# Patient Record
Sex: MALE | Race: WHITE | NOT HISPANIC OR LATINO | Employment: OTHER | URBAN - METROPOLITAN AREA
[De-identification: names, ages, dates, MRNs, and addresses within clinical notes are randomized per-mention and may not be internally consistent; named-entity substitution may affect disease eponyms.]

---

## 2017-01-17 ENCOUNTER — OFFICE VISIT (OUTPATIENT)
Dept: ENDOCRINOLOGY | Facility: MEDICAL CENTER | Age: 67
End: 2017-01-17
Payer: MEDICARE

## 2017-01-17 VITALS
HEART RATE: 50 BPM | BODY MASS INDEX: 38.17 KG/M2 | OXYGEN SATURATION: 95 % | SYSTOLIC BLOOD PRESSURE: 122 MMHG | WEIGHT: 266.6 LBS | HEIGHT: 70 IN | DIASTOLIC BLOOD PRESSURE: 60 MMHG

## 2017-01-17 DIAGNOSIS — I10 ESSENTIAL HYPERTENSION: ICD-10-CM

## 2017-01-17 DIAGNOSIS — E78.2 MIXED HYPERLIPIDEMIA: ICD-10-CM

## 2017-01-17 DIAGNOSIS — Z79.4 TYPE 2 DIABETES MELLITUS WITHOUT COMPLICATION, WITH LONG-TERM CURRENT USE OF INSULIN (HCC): ICD-10-CM

## 2017-01-17 DIAGNOSIS — E11.9 TYPE 2 DIABETES MELLITUS WITHOUT COMPLICATION, WITH LONG-TERM CURRENT USE OF INSULIN (HCC): ICD-10-CM

## 2017-01-17 LAB
HBA1C MFR BLD: 6.9 % (ref ?–5.8)
INT CON NEG: NORMAL
INT CON POS: NORMAL

## 2017-01-17 PROCEDURE — 99214 OFFICE O/P EST MOD 30 MIN: CPT | Mod: 25 | Performed by: INTERNAL MEDICINE

## 2017-01-17 PROCEDURE — 83036 HEMOGLOBIN GLYCOSYLATED A1C: CPT | Performed by: INTERNAL MEDICINE

## 2017-01-17 NOTE — PROGRESS NOTES
Endocrinology Clinic Progress Note  PCP: Carlitos Rosenthal D.O.    CC: Type 2 diabetes    HPI:  Wali Taylor is a 65 y.o. old patient who comes in today for routine follow up.    Type 2 diabetes: He is currently on Toujeo 40 units at bedtime and Humalog 20 units 3 times a day with meals plus correction factor of 2 additional units for every 50 mg/dL blood glucose above 150. He checks blood sugars 2-3 times a day. Fasting blood sugar in the morning has been very well controlled, no fasting hypoglycemia. Pre-meal blood sugar readings have been somewhat variable, ranging from 100s to mid 200s. He reports compliance with medications. One hypoglycemic episode in the past month. He denies any numbness or tingling in feet. Last eye exam was in December 2016, no history of diabetic retinopathy. He is also on metformin 500 mg twice a day.    Hypertension: Blood pressure is well controlled. He is on ARB, in addition to other and hypertensive agents.    Hyperlipidemia: He is currently on Lipitor, tolerating well.    ROS:  Constitutional: Positive for intentional weight loss  Cardiac: No palpitations or racing heart    Past Medical History:  Patient Active Problem List    Diagnosis Date Noted   • Cirrhosis of liver (CMS-HCC) 11/10/2016   • Type 2 diabetes mellitus, uncontrolled (CMS-HCC) 12/30/2015   • Morbid obesity (CMS-[facility]) 12/30/2015   • Essential hypertension 12/30/2015   • Dyslipidemia 12/30/2015       Medications:    Current outpatient prescriptions:   •  Blood Glucose Monitoring Suppl SUPPLIES Misc, One Touch ultra blood glucose test strips, for blood sugar check 4 times a day, 90 day supply, Disp: 400 Each, Rfl: 2  •  potassium chloride SA (K-DUR) 20 MEQ Tab CR, TAKE 1 TABLET DAILY AS NEEDED, Disp: 60 Tab, Rfl: 2  •  rifaximin (XIFAXAN) 550 MG Tab tablet, Take 1 Tab by mouth 2 Times a Day., Disp: 60 Tab, Rfl: 5  •  lactulose 10 GM/15ML Solution, Take 30 g by mouth 2 times a day., Disp: , Rfl:   •  metformin ER  (GLUCOPHAGE XR) 500 MG TABLET SR 24 HR, TAKE 2 TABLETS TWICE A DAY (Patient taking differently: 500 mg bid.), Disp: 360 Tab, Rfl: 3  •  atorvastatin (LIPITOR) 10 MG Tab, Take 1 Tab by mouth every bedtime., Disp: 90 Tab, Rfl: 3  •  Insulin Lispro, Human, (HUMALOG KWIKPEN) 200 UNIT/ML Solution Pen-injector, Inject 25-45 Units as instructed 3 times a day. (Patient taking differently: Inject 20-45 Units as instructed 3 times a day.), Disp: 12 PEN, Rfl: 2  •  Blood Glucose Monitoring Suppl Device, Meter: Dispense FreeStyle InsuLinx meter. Sig. Use as directed for blood sugar monitoring., Disp: 1 Device, Rfl: 0  •  glucose blood strip, 1 Each by Other route as needed. One touch Ultra blue test strips 6x a day, Disp: , Rfl:   •  isosorbide mononitrate SR (IMDUR) 60 MG TABLET SR 24 HR, Take 60 mg by mouth every morning. 2 1/2 tabs BID, Disp: , Rfl:   •  losartan (COZAAR) 50 MG Tab, Take 50 mg by mouth 2 times a day., Disp: , Rfl:   •  metoprolol SR (TOPROL XL) 100 MG TABLET SR 24 HR, Take 100 mg by mouth every day., Disp: , Rfl:   •  aspirin 81 MG tablet, Take 81 mg by mouth every day., Disp: , Rfl:   •  furosemide (LASIX) 20 MG Tab, Take 40 mg by mouth every day., Disp: , Rfl:   •  nitroglycerin (NITROSTAT) 0.4 MG SL Tab, Place 0.4 mg under tongue as needed for Chest Pain., Disp: , Rfl:   •  Multiple Vitamin (MULTIVITAMINS PO), Take  by mouth., Disp: , Rfl:   •  Cholecalciferol (VITAMIN D PO), Take 5,000 Units by mouth., Disp: , Rfl:   •  Vitamin E 200 UNITS Tab, Take  by mouth every day at 6 PM., Disp: , Rfl:   •  Cyanocobalamin (B-12) 1000 MCG Cap, Take  by mouth., Disp: , Rfl:   •  Omega 3 1200 MG Cap, Take  by mouth., Disp: , Rfl:   •  ondansetron (ZOFRAN) 4 MG Tab tablet, Take 1 Tab by mouth every 8 hours as needed for Nausea/Vomiting., Disp: 20 Tab, Rfl: 3  •  Insulin Pen Needle (B-D ULTRAFINE III SHORT PEN) 31G X 8 MM Misc, 1 Each by Does not apply route 4 times a day., Disp: 400 Each, Rfl: 3  •  Insulin Glargine  "(TOUJEO SOLOSTAR) 300 UNIT/ML Solution Pen-injector, Inject 67 Units as instructed every day. (Patient taking differently: Inject 20 Units as instructed every day.), Disp: 9 PEN, Rfl: 9    Labs:  Results for FLOR ADAMS (MRN [id number]) as of 1/17/2017 13:02   Ref. Range 8/10/2016 17:13 10/12/2016 14:28 11/10/2016 07:25 11/10/2016 07:31 11/10/2016 09:40   AST(SGOT) Latest Ref Range: 12-45 U/L 54 (H)       ALT(SGPT) Latest Ref Range: 2-50 U/L 30       Alkaline Phosphatase Latest Ref Range: 30-99 U/L 123 (H)       Total Bilirubin Latest Ref Range: 0.1-1.5 mg/dL 4.2 (H)       Direct Bilirubin Latest Ref Range: 0.1-0.5 mg/dL 1.0 (H)       Indirect Bilirubin Latest Ref Range: 0.0-1.0 mg/dL 3.2 (H)       Albumin Latest Ref Range: 3.2-4.9 g/dL 2.6 (L)       Total Protein Latest Ref Range: 6.0-8.2 g/dL 5.8 (L)       Ammonia Latest Ref Range: 11-45 umol/L 97 (H)       Glycohemoglobin Unknown  6.7      Glucose - Accu-Ck Latest Ref Range: 65-99 mg/dL   93  107 (H)         Physical Examination:  Vital signs: /60 mmHg  Pulse 50  Ht 1.778 m (5' 10\")  Wt 120.929 kg (266 lb 9.6 oz)  BMI 38.25 kg/m2  SpO2 95%  General: Well nourished, no apparent distress, cooperative  Eyes: No scleral icterus, no discharge  CVS: Regular rate and rhythm, S1 S2 normal, no murmur  Resp: Normal effort, clear to auscultation bilaterally  Psych: Alert and oriented, normal mood and affect    Assessment and Plan:    Type 2 diabetes mellitus with hyperglycemia, with long-term current use of insulin (HCC)  · Hemoglobin A1c today in the clinic is 6.9%  · Continue Toujeo 40 units at bedtime, and Humalog Humalog to 20 units 3 times a day with meals plus mid dose correction factor  · Continue metformin  · Repeat labs:  -     MICROALBUMIN CREAT RATIO URINE; Future  -     LIPID PROFILE; Future  -     COMP METABOLIC PANEL; Future  -     CBC WITHOUT DIFFERENTIAL; Future  -     TSH WITH REFLEX TO FT4; Future    Essential hypertension  · Blood pressure " is well controlled  · Continue losartan, in addition to other antihypertensive agents    Mixed hyperlipidemia  · Continue Lipitor  · We will repeat lipid profile    Return in about 3 months (around 4/17/2017).    Thank you for allowing me to participate in the care of this patient.    Calin Leon M.D.  10/12/2016    CC:   Carlitos Rosenthal D.O.    This note was created using voice recognition software (Dragon). The accuracy of the dictation is limited by the abilities of the software. I have reviewed the note prior to signing, however some errors in grammar and context are still possible. If you have any questions related to this note please do not hesitate to contact our office.

## 2017-01-17 NOTE — MR AVS SNAPSHOT
"        Wali Keita Claudia   2017 1:20 PM   Office Visit   MRN: [id number]    Department:  Endocrinology [facility]   Dept Phone:  [phone]    Description:  Male : 1950   Provider:  Calin Leon M.D.           Reason for Visit     Follow-Up Diabetes      Allergies as of 2017     No Known Allergies      You were diagnosed with     Type 2 diabetes mellitus without complication, with long-term current use of insulin (CMS-HCC)   [5489498]       Essential hypertension   [8826262]       Mixed hyperlipidemia   [272.2.ICD-9-CM]         Vital Signs     Blood Pressure Pulse Height Weight Body Mass Index Oxygen Saturation    122/60 mmHg 50 1.778 m (5' 10\") 120.929 kg (266 lb 9.6 oz) 38.25 kg/m2 95%    Smoking Status                   Never Smoker            Basic Information     Date Of Birth Sex Race Ethnicity Preferred Language    1950 Male White Non- English      Your appointments     2017  8:20 AM   Established Patient with Calin Leon M.D.   [facility]    [id number] [place] [address], [address]  [facility] [address] [phone]           You will be receiving a confirmation call a few days before your appointment from our automated call confirmation system.              Problem List              ICD-10-CM Priority Class Noted - Resolved    Type 2 diabetes mellitus, uncontrolled (CMS-HCC) E11.65   2015 - Present    Morbid obesity (CMS-HCC) E66.01   2015 - Present    Essential hypertension I10   2015 - Present    Dyslipidemia E78.5   2015 - Present    Cirrhosis of liver (CMS-HCC) K74.60   11/10/2016 - Present      Health Maintenance        Date Due Completion Dates    DIABETES MONOFILAMENT / LE EXAM 1951 ---    FASTING LIPID PROFILE 10/29/1967 ---    URINE ACR / MICROALBUMIN 10/29/1967 ---    SERUM CREATININE 10/29/1967 ---    IMM DTaP/Tdap/Td Vaccine (1 - Tdap) 10/29/1968 ---    COLONOSCOPY 10/29/1999 ---    IMM " ZOSTER VACCINE 10/29/2009 ---    IMM PNEUMOCOCCAL 65+ (ADULT) LOW/MEDIUM RISK SERIES (1 of 2 - PCV13) 10/29/2014 ---    IMM INFLUENZA (1) 9/1/2016 ---    RETINAL SCREENING 1/27/2017 1/27/2016, 1/27/2016 (Done), 1/25/2016    Override on 1/27/2016: Done    A1C SCREENING 4/12/2017 10/12/2016, 6/6/2016, 2/4/2016            Results     POCT Hemoglobin A1C      Component    Glycohemoglobin    6.9    Internal Control Negative    Internal Control Positive                        Current Immunizations     No immunizations on file.      Below and/or attached are the medications your provider expects you to take. Review all of your home medications and newly ordered medications with your provider and/or pharmacist. Follow medication instructions as directed by your provider and/or pharmacist. Please keep your medication list with you and share with your provider. Update the information when medications are discontinued, doses are changed, or new medications (including over-the-counter products) are added; and carry medication information at all times in the event of emergency situations     Allergies:  No Known Allergies          Medications  Valid as of: January 17, 2017 -  1:28 PM    Generic Name Brand Name Tablet Size Instructions for use    Aspirin (Tab) aspirin 81 MG Take 81 mg by mouth every day.        Atorvastatin Calcium (Tab) LIPITOR 10 MG Take 1 Tab by mouth every bedtime.        Blood Glucose Monitoring Suppl (Device) Blood Glucose Monitoring Suppl  Meter: Dispense FreeStyle InsuLinx meter. Sig. Use as directed for blood sugar monitoring.        Blood Glucose Monitoring Suppl (Misc) Blood Glucose Monitoring Suppl SUPPLIES One Touch ultra blood glucose test strips, for blood sugar check 4 times a day, 90 day supply        Cholecalciferol   Take 5,000 Units by mouth.        Cyanocobalamin (Cap) B-12 1000 MCG Take  by mouth.        Furosemide (Tab) LASIX 20 MG Take 40 mg by mouth every day.        Glucose Blood (Strip)  glucose blood  1 Each by Other route as needed. One touch Ultra blue test strips 6x a day        Insulin Glargine (Solution Pen-injector) Insulin Glargine 300 UNIT/ML Inject 67 Units as instructed every day.        Insulin Lispro (Solution Pen-injector) Insulin Lispro 200 UNIT/ML Inject 25-45 Units as instructed 3 times a day.        Insulin Pen Needle (Misc) Insulin Pen Needle 31G X 8 MM 1 Each by Does not apply route 4 times a day.        Isosorbide Mononitrate (TABLET SR 24 HR) IMDUR 60 MG Take 60 mg by mouth every morning. 2 1/2 tabs BID        Lactulose (Solution) lactulose 10 GM/15ML Take 30 g by mouth 2 times a day.        Losartan Potassium (Tab) COZAAR 50 MG Take 50 mg by mouth 2 times a day.        MetFORMIN HCl (TABLET SR 24 HR) GLUCOPHAGE  MG TAKE 2 TABLETS TWICE A DAY        Metoprolol Succinate (TABLET SR 24 HR) TOPROL  MG Take 100 mg by mouth every day.        Multiple Vitamin   Take  by mouth.        Nitroglycerin (SL Tab) NITROSTAT 0.4 MG Place 0.4 mg under tongue as needed for Chest Pain.        Omega-3 Fatty Acids (Cap) Omega 3 1200 MG Take  by mouth.        Ondansetron HCl (Tab) ZOFRAN 4 MG Take 1 Tab by mouth every 8 hours as needed for Nausea/Vomiting.        Potassium Chloride Jami CR (Tab CR) K-DUR 20 MEQ TAKE 1 TABLET DAILY AS NEEDED        RifAXIMin (Tab) XIFAXAN 550 MG Take 1 Tab by mouth 2 Times a Day.        Vitamin E (Tab) Vitamin E 200 UNITS Take  by mouth every day at 6 PM.        .                 Medicines prescribed today were sent to:     [facility] [address], [place] - [address]    [address] [facility] [address]    Phone: [phone] Fax: [phone]    Open 24 Hours?: No    [facility] - [place], [place] - [address]    [address] [address]    Phone: [phone] Fax: [phone]    Open 24 Hours?: No      Medication refill instructions:       If your prescription bottle indicates you have  medication refills left, it is not necessary to call your provider’s office. Please contact your pharmacy and they will refill your medication.    If your prescription bottle indicates you do not have any refills left, you may request refills at any time through one of the following ways: The online GrownOut system (except Urgent Care), by calling your provider’s office, or by asking your pharmacy to contact your provider’s office with a refill request. Medication refills are processed only during regular business hours and may not be available until the next business day. Your provider may request additional information or to have a follow-up visit with you prior to refilling your medication.   *Please Note: Medication refills are assigned a new Rx number when refilled electronically. Your pharmacy may indicate that no refills were authorized even though a new prescription for the same medication is available at the pharmacy. Please request the medicine by name with the pharmacy before contacting your provider for a refill.           GrownOut Access Code: [email or web address]  Expires: 2/15/2017 12:33 PM    GrownOut  A secure, online tool to manage your health information     [email or web address]’s GrownOut® is a secure, online tool that connects you to your personalized health information from the privacy of your home -- day or night - making it very easy for you to manage your healthcare. Once the activation process is completed, you can even access your medical information using the GrownOut alissa, which is available for free in the Apple Alissa store or Google Play store.     GrownOut provides the following levels of access (as shown below):   My Chart Features   [facility] Doctor [facility]  Specialists [facility]  Urgent  Care Non-[facility]  Primary Care  Doctor   Email your healthcare team securely and privately 24/7 X X X    Manage appointments: schedule your next appointment; view details of past/upcoming appointments X       Request prescription refills. X      View recent personal medical records, including lab and immunizations X X X X   View health record, including health history, allergies, medications X X X X   Read reports about your outpatient visits, procedures, consult and ER notes X X X X   See your discharge summary, which is a recap of your hospital and/or ER visit that includes your diagnosis, lab results, and care plan. X X       How to register for [employer]tech:  1. Go to  [email or web address].  2. Click on the Sign Up Now box, which takes you to the New Member Sign Up page. You will need to provide the following information:  a. Enter your Neurotech Access Code exactly as it appears at the top of this page. (You will not need to use this code after you’ve completed the sign-up process. If you do not sign up before the expiration date, you must request a new code.)   b. Enter your date of birth.   c. Enter your home email address.   d. Click Submit, and follow the next screen’s instructions.  3. Create a Neurotech ID. This will be your Neurotech login ID and cannot be changed, so think of one that is secure and easy to remember.  4. Create a Neurotech password. You can change your password at any time.  5. Enter your Password Reset Question and Answer. This can be used at a later time if you forget your password.   6. Enter your e-mail address. This allows you to receive e-mail notifications when new information is available in Neurotech.  7. Click Sign Up. You can now view your health information.    For assistance activating your Neurotech account, call [phone]

## 2017-04-18 DIAGNOSIS — Z79.4 TYPE 2 DIABETES MELLITUS WITHOUT COMPLICATION, WITH LONG-TERM CURRENT USE OF INSULIN (HCC): ICD-10-CM

## 2017-04-18 DIAGNOSIS — E11.9 TYPE 2 DIABETES MELLITUS WITHOUT COMPLICATION, WITH LONG-TERM CURRENT USE OF INSULIN (HCC): ICD-10-CM

## 2017-04-18 NOTE — TELEPHONE ENCOUNTER
Was the patient seen in the last year in this department? Yes 1/17/2017, next appt 6/9/2017    Does patient have an active prescription for medications requested? Yes     Received Request Via: Pharmacy

## 2017-04-19 DIAGNOSIS — Z79.4 TYPE 2 DIABETES MELLITUS WITHOUT COMPLICATION, WITH LONG-TERM CURRENT USE OF INSULIN (HCC): ICD-10-CM

## 2017-04-19 DIAGNOSIS — E11.9 TYPE 2 DIABETES MELLITUS WITHOUT COMPLICATION, WITH LONG-TERM CURRENT USE OF INSULIN (HCC): ICD-10-CM

## 2017-05-23 RX ORDER — METFORMIN HYDROCHLORIDE 500 MG/1
TABLET, EXTENDED RELEASE ORAL
Qty: 180 TAB | Refills: 3 | Status: SHIPPED | OUTPATIENT
Start: 2017-05-23 | End: 2018-04-25 | Stop reason: SDUPTHER

## 2017-06-09 ENCOUNTER — OFFICE VISIT (OUTPATIENT)
Dept: ENDOCRINOLOGY | Facility: MEDICAL CENTER | Age: 67
End: 2017-06-09
Payer: MEDICARE

## 2017-06-09 VITALS
SYSTOLIC BLOOD PRESSURE: 126 MMHG | HEIGHT: 70 IN | DIASTOLIC BLOOD PRESSURE: 78 MMHG | BODY MASS INDEX: 37.54 KG/M2 | OXYGEN SATURATION: 100 % | WEIGHT: 262.2 LBS | HEART RATE: 65 BPM

## 2017-06-09 DIAGNOSIS — Z79.4 TYPE 2 DIABETES MELLITUS WITHOUT COMPLICATION, WITH LONG-TERM CURRENT USE OF INSULIN (HCC): ICD-10-CM

## 2017-06-09 DIAGNOSIS — I10 ESSENTIAL HYPERTENSION: ICD-10-CM

## 2017-06-09 DIAGNOSIS — E78.2 MIXED HYPERLIPIDEMIA: ICD-10-CM

## 2017-06-09 DIAGNOSIS — E11.9 TYPE 2 DIABETES MELLITUS WITHOUT COMPLICATION, WITH LONG-TERM CURRENT USE OF INSULIN (HCC): ICD-10-CM

## 2017-06-09 LAB
HBA1C MFR BLD: 5.9 % (ref ?–5.8)
INT CON NEG: NORMAL
INT CON POS: NORMAL

## 2017-06-09 PROCEDURE — 99214 OFFICE O/P EST MOD 30 MIN: CPT | Mod: 25,GW | Performed by: INTERNAL MEDICINE

## 2017-06-09 PROCEDURE — 83036 HEMOGLOBIN GLYCOSYLATED A1C: CPT | Mod: QW,GW | Performed by: INTERNAL MEDICINE

## 2017-06-09 RX ORDER — SPIRONOLACTONE 25 MG/1
25 TABLET ORAL DAILY
COMMUNITY

## 2017-06-09 NOTE — MR AVS SNAPSHOT
"        Wali Keita Claudia   2017 8:20 AM   Office Visit   MRN: [id number]    Department:  Endocrinology [facility]   Dept Phone:  [phone]    Description:  Male : 1950   Provider:  Calin Leon M.D.           Reason for Visit     Follow-Up Diabetes      Allergies as of 2017     No Known Allergies      Vital Signs     Blood Pressure Pulse Height Weight Body Mass Index Oxygen Saturation    126/78 mmHg 65 1.778 m (5' 10\") 118.933 kg (262 lb 3.2 oz) 37.62 kg/m2 100%    Smoking Status                   Never Smoker            Basic Information     Date Of Birth Sex Race Ethnicity Preferred Language    1950 Male White Non- English      Your appointments     Sep 15, 2017  8:00 AM   Established Patient with Calin Leon M.D.   [facility]    [address], [address]  [facility] [address] [phone]           You will be receiving a confirmation call a few days before your appointment from our automated call confirmation system.              Problem List              ICD-10-CM Priority Class Noted - Resolved    Type 2 diabetes mellitus, uncontrolled (CMS-HCC) E11.65   2015 - Present    Morbid obesity (CMS-HCC) E66.01   2015 - Present    Essential hypertension I10   2015 - Present    Dyslipidemia E78.5   2015 - Present    Cirrhosis of liver (CMS-HCC) K74.60   11/10/2016 - Present      Health Maintenance        Date Due Completion Dates    DIABETES MONOFILAMENT / LE EXAM 1951 ---    FASTING LIPID PROFILE 10/29/1967 ---    URINE ACR / MICROALBUMIN 10/29/1967 ---    SERUM CREATININE 10/29/1967 ---    IMM DTaP/Tdap/Td Vaccine (1 - Tdap) 10/29/1968 ---    COLONOSCOPY 10/29/1999 ---    IMM ZOSTER VACCINE 10/29/2009 ---    IMM PNEUMOCOCCAL 65+ (ADULT) LOW/MEDIUM RISK SERIES (1 of 2 - PCV13) 10/29/2014 ---    RETINAL SCREENING 2017, 2016 (Done), 2016    Override on 2016: Done    A1C " SCREENING 7/17/2017 1/17/2017, 10/12/2016, 6/6/2016, 2/4/2016            Current Immunizations     No immunizations on file.      Below and/or attached are the medications your provider expects you to take. Review all of your home medications and newly ordered medications with your provider and/or pharmacist. Follow medication instructions as directed by your provider and/or pharmacist. Please keep your medication list with you and share with your provider. Update the information when medications are discontinued, doses are changed, or new medications (including over-the-counter products) are added; and carry medication information at all times in the event of emergency situations     Allergies:  No Known Allergies          Medications  Valid as of: June 09, 2017 -  8:47 AM    Generic Name Brand Name Tablet Size Instructions for use    Aspirin (Tab) aspirin 81 MG Take 81 mg by mouth every day.        Atorvastatin Calcium (Tab) LIPITOR 10 MG Take 1 Tab by mouth every bedtime.        Blood Glucose Monitoring Suppl (Device) Blood Glucose Monitoring Suppl  Meter: Dispense FreeStyle InsuLinx meter. Sig. Use as directed for blood sugar monitoring.        Blood Glucose Monitoring Suppl (Misc) Blood Glucose Monitoring Suppl SUPPLIES One Touch ultra blood glucose test strips, for blood sugar check 4 times a day, 90 day supply        Cholecalciferol   Take 5,000 Units by mouth.        Cyanocobalamin (Cap) B-12 1000 MCG Take  by mouth.        Furosemide (Tab) LASIX 20 MG Take 40 mg by mouth every day.        Glucose Blood (Strip) glucose blood  1 Strip by Other route as needed. One touch Ultra blue test strips 6x a day        Insulin Glargine (Solution Pen-injector) Insulin Glargine 300 UNIT/ML Inject 40 Units as instructed every bedtime.        Insulin Lispro (Solution Pen-injector) Insulin Lispro 200 UNIT/ML Inject 20-35 Units as instructed 3 times a day.        Insulin Pen Needle (Misc) B-D ULTRAFINE III SHORT PEN 31G X 8  MM USE FOUR TIMES A DAY        Insulin Pen Needle (Misc) Insulin Pen Needle 31G X 8 MM 1 Each by Does not apply route 4 times a day.        Isosorbide Mononitrate (TABLET SR 24 HR) IMDUR 60 MG Take 60 mg by mouth every morning. 2 1/2 tabs BID        Lactulose (Solution) lactulose 10 GM/15ML Take 30 g by mouth 2 times a day.        Losartan Potassium (Tab) COZAAR 50 MG Take 50 mg by mouth 2 times a day.        MetFORMIN HCl (TABLET SR 24 HR) GLUCOPHAGE   mg bid.        Metoprolol Succinate (TABLET SR 24 HR) TOPROL  MG Take 100 mg by mouth every day.        Multiple Vitamin   Take  by mouth.        Nitroglycerin (SL Tab) NITROSTAT 0.4 MG Place 0.4 mg under tongue as needed for Chest Pain.        Omega-3 Fatty Acids (Cap) Omega 3 1200 MG Take  by mouth.        Ondansetron HCl (Tab) ZOFRAN 4 MG Take 1 Tab by mouth every 8 hours as needed for Nausea/Vomiting.        Potassium Chloride Jami CR (Tab CR) Kdur 20 MEQ TAKE 1 TABLET DAILY AS NEEDED        RifAXIMin (Tab) XIFAXAN 550 MG Take 1 Tab by mouth 2 Times a Day.        Spironolactone (Tab) ALDACTONE 25 MG Take 25 mg by mouth every day.        Vitamin E (Tab) Vitamin E 200 UNITS Take  by mouth every day at 6 PM.        .                 Medicines prescribed today were sent to:     [facility] [address] [place] [id number]    Phone: [phone] Fax: [phone]    Open 24 Hours?: No      Medication refill instructions:       If your prescription bottle indicates you have medication refills left, it is not necessary to call your provider’s office. Please contact your pharmacy and they will refill your medication.    If your prescription bottle indicates you do not have any refills left, you may request refills at any time through one of the following ways: The online CoinKeeper system (except Urgent Care), by calling your provider’s office, or by asking your pharmacy to contact your provider’s  office with a refill request. Medication refills are processed only during regular business hours and may not be available until the next business day. Your provider may request additional information or to have a follow-up visit with you prior to refilling your medication.   *Please Note: Medication refills are assigned a new Rx number when refilled electronically. Your pharmacy may indicate that no refills were authorized even though a new prescription for the same medication is available at the pharmacy. Please request the medicine by name with the pharmacy before contacting your provider for a refill.           Allen Tours Access Code: [email or web address]  Expires: 7/9/2017  8:07 AM    Allen Tours  A secure, online tool to manage your health information     [employer]’s Allen Tours® is a secure, online tool that connects you to your personalized health information from the privacy of your home -- day or night - making it very easy for you to manage your healthcare. Once the activation process is completed, you can even access your medical information using the Allen Tours alissa, which is available for free in the Apple Alissa store or Google Play store.     Allen Tours provides the following levels of access (as shown below):   My Chart Features   [facility] Doctor [facility]  Urgent  Care Non-[facility]  Primary Care  Doctor   Email your healthcare team securely and privately 24/7 X X X    Manage appointments: schedule your next appointment; view details of past/upcoming appointments X      Request prescription refills. X      View recent personal medical records, including lab and immunizations X X X X   View health record, including health history, allergies, medications X X X X   Read reports about your outpatient visits, procedures, consult and ER notes X X X X   See your discharge summary, which is a recap of your hospital and/or ER visit that includes your diagnosis, lab results, and care plan. X X       How  to register for HiFiKiddo:  1. Go to  [email or web address].  2. Click on the Sign Up Now box, which takes you to the New Member Sign Up page. You will need to provide the following information:  a. Enter your HiFiKiddo Access Code exactly as it appears at the top of this page. (You will not need to use this code after you’ve completed the sign-up process. If you do not sign up before the expiration date, you must request a new code.)   b. Enter your date of birth.   c. Enter your home email address.   d. Click Submit, and follow the next screen’s instructions.  3. Create a HiFiKiddo ID. This will be your HiFiKiddo login ID and cannot be changed, so think of one that is secure and easy to remember.  4. Create a [employer] System[employer] password. You can change your password at any time.  5. Enter your Password Reset Question and Answer. This can be used at a later time if you forget your password.   6. Enter your e-mail address. This allows you to receive e-mail notifications when new information is available in HiFiKiddo.  7. Click Sign Up. You can now view your health information.    For assistance activating your HiFiKiddo account, call [phone]

## 2017-06-09 NOTE — PROGRESS NOTES
Endocrinology Clinic Progress Note  PCP: Carlitos Rosenthal D.O.    CC: Type 2 diabetes    HPI:  Wali Taylor is a 65 y.o. old patient who comes in today for routine follow up.    Type 2 diabetes: He is currently on Toujeo 30 units at bedtime and Humalog 20 units 3 times a day with meals plus correction factor of 3 additional units for every 50 mg/dL blood glucose above 150. Also on metformin 500 mg twice a day. He checks blood sugars 2-3 times a day. Fasting blood sugar in the morning has been very well controlled, no fasting hypoglycemia. Pre-meal blood sugar readings are mostly below 180. Last eye exam was done in December 2016.    Hypertension: Blood pressure is well controlled. He is on ARB, in addition to other and hypertensive agents.    Hyperlipidemia: He is currently on Lipitor, tolerating well.    ROS:  Constitutional: Positive for intentional weight loss  Cardiac: No palpitations or racing heart    Past Medical History:  Patient Active Problem List    Diagnosis Date Noted   • Cirrhosis of liver (CMS-HCC) 11/10/2016   • Type 2 diabetes mellitus, uncontrolled (CMS-HCC) 12/30/2015   • Morbid obesity (CMS-HCC) 12/30/2015   • Essential hypertension 12/30/2015   • Dyslipidemia 12/30/2015       Medications:    Current outpatient prescriptions:   •  spironolactone (ALDACTONE) 25 MG Tab, Take 25 mg by mouth every day., Disp: , Rfl:   •  metformin ER (GLUCOPHAGE XR) 500 MG TABLET SR 24 HR, 500 mg bid., Disp: 180 Tab, Rfl: 3  •  Insulin Glargine (TOUJEO SOLOSTAR) 300 UNIT/ML Solution Pen-injector, Inject 40 Units as instructed every bedtime., Disp: 12 PEN, Rfl: 3  •  Insulin Lispro (HUMALOG KWIKPEN) 200 UNIT/ML Solution Pen-injector, Inject 20-35 Units as instructed 3 times a day., Disp: 15 PEN, Rfl: 3  •  Insulin Pen Needle (B-D ULTRAFINE III SHORT PEN) 31G X 8 MM Misc, 1 Each by Does not apply route 4 times a day., Disp: 400 Each, Rfl: 3  •  B-D ULTRAFINE III SHORT PEN 31G X 8 MM Misc, USE FOUR TIMES A DAY,  Disp: 360 Each, Rfl: 2  •  Blood Glucose Monitoring Suppl SUPPLIES Misc, One Touch ultra blood glucose test strips, for blood sugar check 4 times a day, 90 day supply, Disp: 400 Each, Rfl: 2  •  potassium chloride SA (K-DUR) 20 MEQ Tab CR, TAKE 1 TABLET DAILY AS NEEDED, Disp: 60 Tab, Rfl: 2  •  ondansetron (ZOFRAN) 4 MG Tab tablet, Take 1 Tab by mouth every 8 hours as needed for Nausea/Vomiting., Disp: 20 Tab, Rfl: 3  •  lactulose 10 GM/15ML Solution, Take 30 g by mouth 2 times a day., Disp: , Rfl:   •  atorvastatin (LIPITOR) 10 MG Tab, Take 1 Tab by mouth every bedtime., Disp: 90 Tab, Rfl: 3  •  Blood Glucose Monitoring Suppl Device, Meter: Dispense FreeStyle InsuLinx meter. Sig. Use as directed for blood sugar monitoring., Disp: 1 Device, Rfl: 0  •  losartan (COZAAR) 50 MG Tab, Take 50 mg by mouth 2 times a day., Disp: , Rfl:   •  metoprolol SR (TOPROL XL) 100 MG TABLET SR 24 HR, Take 100 mg by mouth every day., Disp: , Rfl:   •  aspirin 81 MG tablet, Take 81 mg by mouth every day., Disp: , Rfl:   •  furosemide (LASIX) 20 MG Tab, Take 40 mg by mouth every day., Disp: , Rfl:   •  Multiple Vitamin (MULTIVITAMINS PO), Take  by mouth., Disp: , Rfl:   •  Cholecalciferol (VITAMIN D PO), Take 5,000 Units by mouth., Disp: , Rfl:   •  Cyanocobalamin (B-12) 1000 MCG Cap, Take  by mouth., Disp: , Rfl:   •  glucose blood strip, 1 Strip by Other route as needed. One touch Ultra blue test strips 6x a day, Disp: 550 Strip, Rfl: 3  •  nitroglycerin (NITROSTAT) 0.4 MG SL Tab, Place 0.4 mg under tongue as needed for Chest Pain., Disp: , Rfl:     Labs:   Ref. Range 8/10/2016 17:13 10/12/2016 14:28 11/10/2016 07:25 11/10/2016 07:31 11/10/2016 09:40   AST(SGOT) Latest Ref Range: 12-45 U/L 54 (H)       ALT(SGPT) Latest Ref Range: 2-50 U/L 30       Alkaline Phosphatase Latest Ref Range: 30-99 U/L 123 (H)       Total Bilirubin Latest Ref Range: 0.1-1.5 mg/dL 4.2 (H)       Direct Bilirubin Latest Ref Range: 0.1-0.5 mg/dL 1.0 (H)      "  Indirect Bilirubin Latest Ref Range: 0.0-1.0 mg/dL 3.2 (H)       Albumin Latest Ref Range: 3.2-4.9 g/dL 2.6 (L)       Total Protein Latest Ref Range: 6.0-8.2 g/dL 5.8 (L)       Ammonia Latest Ref Range: 11-45 umol/L 97 (H)       Glycohemoglobin Unknown  6.7      Glucose - Accu-Ck Latest Ref Range: 65-99 mg/dL   93  107 (H)         Physical Examination:  Vital signs: /78 mmHg  Pulse 65  Ht 1.778 m (5' 10\")  Wt 118.933 kg (262 lb 3.2 oz)  BMI 37.62 kg/m2  SpO2 100%  General: Well nourished, no apparent distress, cooperative  Eyes: No discharge  CVS: Regular rate and rhythm, S1 S2 normal, no murmur  Resp: Normal effort, clear to auscultation bilaterally  Psych: Alert and oriented, normal mood and affect    Assessment and Plan:    Type 2 diabetes mellitus with hyperglycemia, with long-term current use of insulin (HCC)  · Hemoglobin A1c today in the clinic is 5.9%  · No issues of hypoglycemia  · Continue Toujeo 30 units at bedtime, and Humalog 20 units 3 times a day with meals plus mid dose correction factor  · Continue metformin    Essential hypertension  · Blood pressure is well controlled  · Continue losartan, in addition to other antihypertensive agents    Mixed hyperlipidemia  · Continue Lipitor    Return in about 4 months (around 10/9/2017).    Thank you for allowing me to participate in the care of this patient.    Calin Leon M.D.     CC:   Carlitos Rosenthal D.O.    This note was created using voice recognition software (Dragon). The accuracy of the dictation is limited by the abilities of the software. I have reviewed the note prior to signing, however some errors in grammar and context are still possible. If you have any questions related to this note please do not hesitate to contact our office.     "

## 2017-09-01 DIAGNOSIS — E11.9 TYPE 2 DIABETES MELLITUS WITHOUT COMPLICATION, WITH LONG-TERM CURRENT USE OF INSULIN (HCC): ICD-10-CM

## 2017-09-01 DIAGNOSIS — Z79.4 TYPE 2 DIABETES MELLITUS WITHOUT COMPLICATION, WITH LONG-TERM CURRENT USE OF INSULIN (HCC): ICD-10-CM

## 2017-09-15 ENCOUNTER — OFFICE VISIT (OUTPATIENT)
Dept: ENDOCRINOLOGY | Facility: MEDICAL CENTER | Age: 67
End: 2017-09-15
Payer: MEDICARE

## 2017-09-15 VITALS
OXYGEN SATURATION: 97 % | HEART RATE: 59 BPM | WEIGHT: 261 LBS | HEIGHT: 70 IN | BODY MASS INDEX: 37.37 KG/M2 | SYSTOLIC BLOOD PRESSURE: 124 MMHG | DIASTOLIC BLOOD PRESSURE: 64 MMHG

## 2017-09-15 DIAGNOSIS — E78.2 MIXED HYPERLIPIDEMIA: ICD-10-CM

## 2017-09-15 DIAGNOSIS — E11.9 TYPE 2 DIABETES MELLITUS WITHOUT COMPLICATION, WITH LONG-TERM CURRENT USE OF INSULIN (HCC): ICD-10-CM

## 2017-09-15 DIAGNOSIS — Z79.4 TYPE 2 DIABETES MELLITUS WITHOUT COMPLICATION, WITH LONG-TERM CURRENT USE OF INSULIN (HCC): ICD-10-CM

## 2017-09-15 DIAGNOSIS — I10 ESSENTIAL HYPERTENSION: ICD-10-CM

## 2017-09-15 LAB
HBA1C MFR BLD: 5.7 % (ref ?–5.8)
INT CON NEG: NORMAL
INT CON POS: NORMAL

## 2017-09-15 PROCEDURE — 83036 HEMOGLOBIN GLYCOSYLATED A1C: CPT | Mod: GW | Performed by: INTERNAL MEDICINE

## 2017-09-15 PROCEDURE — 99214 OFFICE O/P EST MOD 30 MIN: CPT | Mod: GW | Performed by: INTERNAL MEDICINE

## 2017-09-15 NOTE — PROGRESS NOTES
Endocrinology Clinic Progress Note  PCP: Carlitos Rosenthal D.O.    CC: Type 2 diabetes    HPI:  Wali Taylor is a 65 y.o. old patient who comes in today for routine follow up.    Type 2 diabetes: He is currently on Toujeo 25 units at bedtime and Humalog 20 units 3 times a day with meals plus mid dose correction factor. He checks blood sugars 2-3 times a day. He has not checked fasting blood sugar greatly as he tends to eat in the middle of the night and blood sugar morning is on highger side in low 200s. No hypoglycemia.    Hypertension: Blood pressure is well controlled. He is on ARB, in addition to other and hypertensive agents.    Hyperlipidemia: He is currently on Lipitor, tolerating well.    ROS:  Constitutional: Positive for intentional weight loss  Cardiac: No palpitations or racing heart    Past Medical History:  Patient Active Problem List    Diagnosis Date Noted   • Cirrhosis of liver (CMS-HCC) 11/10/2016   • Type 2 diabetes mellitus, uncontrolled (CMS-HCC) 12/30/2015   • Morbid obesity (CMS-HCC) 12/30/2015   • Essential hypertension 12/30/2015   • Dyslipidemia 12/30/2015       Medications:    Current Outpatient Prescriptions:   •  Insulin Pen Needle (B-D ULTRAFINE III SHORT PEN) 31G X 8 MM Misc, 1 Each by Does not apply route 4 times a day., Disp: 400 Each, Rfl: 3  •  Insulin Lispro (HUMALOG KWIKPEN) 200 UNIT/ML Solution Pen-injector, Inject 20-35 Units as instructed 3 times a day., Disp: 15 PEN, Rfl: 3  •  Insulin Glargine (TOUJEO SOLOSTAR) 300 UNIT/ML Solution Pen-injector, Inject 40 Units as instructed every bedtime. (Patient taking differently: Inject 25 Units as instructed every bedtime.), Disp: 12 PEN, Rfl: 3  •  metformin ER (GLUCOPHAGE XR) 500 MG TABLET SR 24 HR, 500 mg bid., Disp: 180 Tab, Rfl: 3  •  glucose blood strip, 1 Strip by Other route as needed. One touch Ultra blue test strips 6x a day, Disp: 550 Strip, Rfl: 3  •  B-D ULTRAFINE III SHORT PEN 31G X 8 MM Misc, USE FOUR TIMES A DAY,  "Disp: 360 Each, Rfl: 2  •  Blood Glucose Monitoring Suppl SUPPLIES Misc, One Touch ultra blood glucose test strips, for blood sugar check 4 times a day, 90 day supply, Disp: 400 Each, Rfl: 2  •  potassium chloride SA (K-DUR) 20 MEQ Tab CR, TAKE 1 TABLET DAILY AS NEEDED, Disp: 60 Tab, Rfl: 2  •  lactulose 10 GM/15ML Solution, Take 30 g by mouth 2 times a day., Disp: , Rfl:   •  atorvastatin (LIPITOR) 10 MG Tab, Take 1 Tab by mouth every bedtime., Disp: 90 Tab, Rfl: 3  •  Blood Glucose Monitoring Suppl Device, Meter: Dispense FreeStyle InsuLinx meter. Sig. Use as directed for blood sugar monitoring., Disp: 1 Device, Rfl: 0  •  losartan (COZAAR) 50 MG Tab, Take 50 mg by mouth 2 times a day., Disp: , Rfl:   •  metoprolol SR (TOPROL XL) 100 MG TABLET SR 24 HR, Take 100 mg by mouth every day., Disp: , Rfl:   •  aspirin 81 MG tablet, Take 81 mg by mouth every day., Disp: , Rfl:   •  furosemide (LASIX) 20 MG Tab, Take 40 mg by mouth every day., Disp: , Rfl:   •  nitroglycerin (NITROSTAT) 0.4 MG SL Tab, Place 0.4 mg under tongue as needed for Chest Pain., Disp: , Rfl:   •  Multiple Vitamin (MULTIVITAMINS PO), Take  by mouth., Disp: , Rfl:   •  Cholecalciferol (VITAMIN D PO), Take 5,000 Units by mouth., Disp: , Rfl:   •  Cyanocobalamin (B-12) 1000 MCG Cap, Take  by mouth., Disp: , Rfl:   •  spironolactone (ALDACTONE) 25 MG Tab, Take 25 mg by mouth every day., Disp: , Rfl:   •  ondansetron (ZOFRAN) 4 MG Tab tablet, Take 1 Tab by mouth every 8 hours as needed for Nausea/Vomiting., Disp: 20 Tab, Rfl: 3    Physical Examination:  Vital signs: /64   Pulse (!) 59   Ht 1.778 m (5' 10\")   Wt 118.4 kg (261 lb)   SpO2 97%   BMI 37.45 kg/m²   General: Well nourished, no apparent distress, cooperative  Eyes: No discharge  CVS: Regular rate and rhythm, S1 S2 normal  Resp: Normal effort, clear to auscultation bilaterally  Psych: Alert and oriented, normal mood and affect    Assessment and Plan:    Type 2 diabetes mellitus with " hyperglycemia, with long-term current use of insulin (HCC)  · Hemoglobin A1c today in the clinic is 5.7%  · No issues of hypoglycemia  · Continue Toujeo 25 units at bedtime, and Humalog 20 units 3 times a day with meals plus mid dose correction factor  · Continue metformin    Essential hypertension  · Blood pressure is well controlled  · Continue losartan, in addition to other antihypertensive agents    Mixed hyperlipidemia  · Continue Lipitor    Return in about 3 months (around 12/15/2017).    Thank you for allowing me to participate in the care of this patient.    Calin Leon M.D.     CC:   Carlitos Rosenthal D.O.    This note was created using voice recognition software (Dragon). The accuracy of the dictation is limited by the abilities of the software. I have reviewed the note prior to signing, however some errors in grammar and context are still possible. If you have any questions related to this note please do not hesitate to contact our office.

## 2017-11-30 DIAGNOSIS — Z79.4 TYPE 2 DIABETES MELLITUS WITHOUT COMPLICATION, WITH LONG-TERM CURRENT USE OF INSULIN (HCC): ICD-10-CM

## 2017-11-30 DIAGNOSIS — E11.9 TYPE 2 DIABETES MELLITUS WITHOUT COMPLICATION, WITH LONG-TERM CURRENT USE OF INSULIN (HCC): ICD-10-CM

## 2018-04-25 RX ORDER — METFORMIN HYDROCHLORIDE 500 MG/1
TABLET, EXTENDED RELEASE ORAL
Qty: 180 TAB | Refills: 3 | Status: SHIPPED | OUTPATIENT
Start: 2018-04-25

## 2018-04-25 NOTE — TELEPHONE ENCOUNTER
Pt's wife or daughter called and states pt is out of metformin and needs refills. Pt is on hospice for the moment.